# Patient Record
Sex: FEMALE | Race: WHITE | Employment: STUDENT | URBAN - METROPOLITAN AREA
[De-identification: names, ages, dates, MRNs, and addresses within clinical notes are randomized per-mention and may not be internally consistent; named-entity substitution may affect disease eponyms.]

---

## 2023-02-16 ENCOUNTER — HOSPITAL ENCOUNTER (EMERGENCY)
Age: 18
Discharge: HOME | End: 2023-02-16
Attending: EMERGENCY MEDICINE | Admitting: EMERGENCY MEDICINE

## 2023-02-16 ENCOUNTER — APPOINTMENT (OUTPATIENT)
Dept: GENERAL RADIOLOGY | Age: 18
End: 2023-02-16

## 2023-02-16 VITALS
RESPIRATION RATE: 18 BRPM | SYSTOLIC BLOOD PRESSURE: 95 MMHG | TEMPERATURE: 98.6 F | DIASTOLIC BLOOD PRESSURE: 61 MMHG | HEART RATE: 85 BPM

## 2023-02-16 DIAGNOSIS — M23.92 INTERNAL DERANGEMENT OF LEFT KNEE: Primary | ICD-10-CM

## 2023-02-16 PROCEDURE — 73564 X-RAY EXAM KNEE 4 OR MORE: CPT | Performed by: RADIOLOGY

## 2023-02-16 PROCEDURE — 99283 EMERGENCY DEPT VISIT LOW MDM: CPT

## 2023-02-16 RX ORDER — LAMOTRIGINE 200 MG/1
200 TABLET ORAL 2 TIMES DAILY
COMMUNITY

## 2023-02-16 RX ORDER — SERTRALINE HYDROCHLORIDE 25 MG/1
25 TABLET, FILM COATED ORAL DAILY
COMMUNITY

## 2023-02-16 NOTE — ED PROVIDER NOTES
CC/HPI: 23year-old female to the emergency department chief complaint of left knee injury. Patient states she was at a concert last night and she got bumped and she stepped awkwardly twisting her knee. Patient states she felt a sharp pain and it bothered her for the rest of the evening. Patient states she did not have any ice so could not ice it and overnight her knee became more swollen. Patient states she has a history of previous injury in which she believes she had a small tear in her meniscus. No paresthesias no other injury      VITALS/PMH/PSH: Reviewed per nurses notes    REVIEW OF SYSTEMS: As in chief complaint history of present illness, otherwise all other systems are reviewed and negative the total 10 systems reviewed    PHYSICAL EXAM:  GEN: Pt alert and oriented, no acute distress  HEENT:         Normocephalic/Atramatic        PERRL, EOMI  HEART: Reg S1/S2, without murmer, rub or gallop  LUNGS: Clear to auscultation bilaterally, respirations even and unlabored  MUSCULOSKELETAL/EXTREMITITES:  No other signs of trauma, cyanosis or edema. Examination of the left knee shows no redness warmth or signs of infection. Patient has what appears to be a moderate joint effusion. There is no swelling anterior to the patella to suggest bursitis. Patient with mild appearing tenderness along the medial aspect of the joint. Patient able to bend the knee to 90 degrees with discomfort. Negative drawer. No calf swelling or tenderness. Neurovascular intact distally. LYMPH: no peripheral lympadenopathy noted  SKIN:  Warm & dry, no rash  NEUROLOGIC:  Alert and oriented. Speech clear    Medical decision making/ED course;  X-rays were obtained which were interpreted by the radiologist as follows;    Narrative   EXAMINATION:   FOUR XRAY VIEWS OF THE LEFT KNEE       2/16/2023 10:11 am       COMPARISON:   None.        HISTORY:   ORDERING SYSTEM PROVIDED HISTORY: injury   TECHNOLOGIST PROVIDED HISTORY:   Reason for exam:->injury   Is the patient pregnant?->No   What reading provider will be dictating this exam?->CRC       FINDINGS:   Knee is normally aligned. No fracture or bone erosion identified. Small   suprapatellar effusion is noted. Joint spaces are preserved. Impression   Small suprapatellar effusion. No acute osseous abnormality. Patient was given a knee immobilizer and crutches and instructions on their use. Discussed the possibility of a ligamentous or tendinous injury and the need for follow-up with Ortho. Patient voiced understanding. Was offered stronger pain medication however refused. Final Clinical impression;  1) internal knee derangement left knee    Disposition/plan; patient discharged home in stable condition given discharge instructions on knee pain and injury. Patient referred to orthopedics. Over-the-counter ibuprofen as needed for discomfort. Frequent ice over the next 24 hours. Return for worsening or changes to symptoms.      Jc Ricketts DO  02/16/23 2270

## 2023-09-07 ENCOUNTER — HOSPITAL ENCOUNTER (OUTPATIENT)
Dept: GENERAL RADIOLOGY | Age: 18
Discharge: HOME | End: 2023-09-09
Payer: COMMERCIAL

## 2023-09-07 ENCOUNTER — OFFICE VISIT (OUTPATIENT)
Dept: FAMILY MEDICINE CLINIC | Age: 18
End: 2023-09-07
Payer: COMMERCIAL

## 2023-09-07 ENCOUNTER — HOSPITAL ENCOUNTER (OUTPATIENT)
Age: 18
Discharge: HOME | End: 2023-09-09
Payer: COMMERCIAL

## 2023-09-07 VITALS
SYSTOLIC BLOOD PRESSURE: 100 MMHG | HEIGHT: 66 IN | BODY MASS INDEX: 18.84 KG/M2 | DIASTOLIC BLOOD PRESSURE: 60 MMHG | HEART RATE: 79 BPM | TEMPERATURE: 97.6 F | OXYGEN SATURATION: 99 % | WEIGHT: 117.2 LBS

## 2023-09-07 DIAGNOSIS — R05.3 CHRONIC COUGH: Primary | ICD-10-CM

## 2023-09-07 DIAGNOSIS — R05.3 CHRONIC COUGH: ICD-10-CM

## 2023-09-07 PROCEDURE — 71046 X-RAY EXAM CHEST 2 VIEWS: CPT

## 2023-09-07 PROCEDURE — 99203 OFFICE O/P NEW LOW 30 MIN: CPT | Performed by: FAMILY MEDICINE

## 2023-09-07 RX ORDER — AZITHROMYCIN 250 MG/1
TABLET, FILM COATED ORAL
Qty: 6 TABLET | Refills: 0 | Status: SHIPPED | OUTPATIENT
Start: 2023-09-07

## 2023-09-07 RX ORDER — FLUTICASONE PROPIONATE 50 MCG
1 SPRAY, SUSPENSION (ML) NASAL DAILY
Qty: 1 EACH | Refills: 0 | Status: SHIPPED | OUTPATIENT
Start: 2023-09-07

## 2023-09-07 SDOH — ECONOMIC STABILITY: FOOD INSECURITY: WITHIN THE PAST 12 MONTHS, THE FOOD YOU BOUGHT JUST DIDN'T LAST AND YOU DIDN'T HAVE MONEY TO GET MORE.: NEVER TRUE

## 2023-09-07 SDOH — ECONOMIC STABILITY: INCOME INSECURITY: HOW HARD IS IT FOR YOU TO PAY FOR THE VERY BASICS LIKE FOOD, HOUSING, MEDICAL CARE, AND HEATING?: NOT HARD AT ALL

## 2023-09-07 SDOH — ECONOMIC STABILITY: HOUSING INSECURITY
IN THE LAST 12 MONTHS, WAS THERE A TIME WHEN YOU DID NOT HAVE A STEADY PLACE TO SLEEP OR SLEPT IN A SHELTER (INCLUDING NOW)?: NO

## 2023-09-07 SDOH — ECONOMIC STABILITY: FOOD INSECURITY: WITHIN THE PAST 12 MONTHS, YOU WORRIED THAT YOUR FOOD WOULD RUN OUT BEFORE YOU GOT MONEY TO BUY MORE.: NEVER TRUE

## 2023-09-07 ASSESSMENT — PATIENT HEALTH QUESTIONNAIRE - PHQ9
1. LITTLE INTEREST OR PLEASURE IN DOING THINGS: 0
SUM OF ALL RESPONSES TO PHQ QUESTIONS 1-9: 0
SUM OF ALL RESPONSES TO PHQ QUESTIONS 1-9: 0
4. FEELING TIRED OR HAVING LITTLE ENERGY: 0
3. TROUBLE FALLING OR STAYING ASLEEP: 0
9. THOUGHTS THAT YOU WOULD BE BETTER OFF DEAD, OR OF HURTING YOURSELF: 0
8. MOVING OR SPEAKING SO SLOWLY THAT OTHER PEOPLE COULD HAVE NOTICED. OR THE OPPOSITE, BEING SO FIGETY OR RESTLESS THAT YOU HAVE BEEN MOVING AROUND A LOT MORE THAN USUAL: 0
SUM OF ALL RESPONSES TO PHQ QUESTIONS 1-9: 0
2. FEELING DOWN, DEPRESSED OR HOPELESS: 0
7. TROUBLE CONCENTRATING ON THINGS, SUCH AS READING THE NEWSPAPER OR WATCHING TELEVISION: 0
6. FEELING BAD ABOUT YOURSELF - OR THAT YOU ARE A FAILURE OR HAVE LET YOURSELF OR YOUR FAMILY DOWN: 0
5. POOR APPETITE OR OVEREATING: 0
SUM OF ALL RESPONSES TO PHQ9 QUESTIONS 1 & 2: 0
SUM OF ALL RESPONSES TO PHQ QUESTIONS 1-9: 0
10. IF YOU CHECKED OFF ANY PROBLEMS, HOW DIFFICULT HAVE THESE PROBLEMS MADE IT FOR YOU TO DO YOUR WORK, TAKE CARE OF THINGS AT HOME, OR GET ALONG WITH OTHER PEOPLE: 0

## 2023-09-07 NOTE — PROGRESS NOTES
Subjective:      Patient ID: Hilary Sanches is a 21 y.o. female who presents today for:     Chief Complaint   Patient presents with    New Patient     Post covid cough onset february HPI  Hilary Sanches very pleasant 61-year-old female presents today to establish care. She states that she has had a cough since February after being diagnosed with COVID. She states her symptoms have improved however her cough did not completely resolve. She denies any productive quality to her cough and there are no alleviating or exacerbating factors. She denies any fever, or chills or shortness of breath. Symptoms are mildly worse when she lays down    Past Medical History:   Diagnosis Date    Depression      History reviewed. No pertinent surgical history. History reviewed. No pertinent family history. Social History     Socioeconomic History    Marital status: Single     Spouse name: Not on file    Number of children: Not on file    Years of education: Not on file    Highest education level: Not on file   Occupational History    Not on file   Tobacco Use    Smoking status: Never    Smokeless tobacco: Never   Substance and Sexual Activity    Alcohol use: Yes    Drug use: Yes     Types: Marijuana [place] [employer])    Sexual activity: Yes   Other Topics Concern    Not on file   Social History Narrative    Not on file     Social Determinants of Health     Financial Resource Strain: Low Risk  (9/7/2023)    Overall Financial Resource Strain (CARDIA)     Difficulty of Paying Living Expenses: Not hard at all   Food Insecurity: No Food Insecurity (9/7/2023)    Hunger Vital Sign     Worried About Running Out of Food in the Last Year: Never true     [address] in the Last Year: Never true   Transportation Needs: Unknown (9/7/2023)    PRAPARE - Transportation     Lack of Transportation (Medical): Not on file     Lack of Transportation (Non-Medical):  No   Physical Activity: Not on file   Stress: Not on file   Social

## 2023-09-10 ASSESSMENT — ENCOUNTER SYMPTOMS
SHORTNESS OF BREATH: 0
CHEST TIGHTNESS: 0
COUGH: 1
DIARRHEA: 0
BLOOD IN STOOL: 0
NAUSEA: 0
ABDOMINAL PAIN: 0
APNEA: 0
CONSTIPATION: 0
VOMITING: 0

## 2023-09-30 DIAGNOSIS — R05.3 CHRONIC COUGH: ICD-10-CM

## 2023-10-02 RX ORDER — FLUTICASONE PROPIONATE 50 MCG
SPRAY, SUSPENSION (ML) NASAL
Qty: 3 EACH | Refills: 1 | Status: SHIPPED | OUTPATIENT
Start: 2023-10-02

## 2023-10-02 NOTE — TELEPHONE ENCOUNTER
Comments:     Last Office Visit (last PCP visit):   9/7/2023    Next Visit Date:  No future appointments. **If hasn't been seen in over a year OR hasn't followed up according to last diabetes/ADHD visit, make appointment for patient before sending refill to provider.     Rx requested:  Requested Prescriptions     Pending Prescriptions Disp Refills    fluticasone (FLONASE) 50 MCG/ACT nasal spray [Pharmacy Med Name: FLUTICASONE PROP 50 MCG SPRAY]       Sig: SPRAY 1 SPRAY BY NASAL ROUTE EVERY DAY

## 2024-01-31 ENCOUNTER — CLINICAL SUPPORT (OUTPATIENT)
Dept: ORTHOPEDIC SURGERY | Facility: CLINIC | Age: 19
End: 2024-01-31
Payer: COMMERCIAL

## 2024-01-31 ENCOUNTER — OFFICE VISIT (OUTPATIENT)
Dept: ORTHOPEDIC SURGERY | Facility: CLINIC | Age: 19
End: 2024-01-31
Payer: COMMERCIAL

## 2024-01-31 DIAGNOSIS — M25.562 ACUTE PAIN OF BOTH KNEES: ICD-10-CM

## 2024-01-31 DIAGNOSIS — M25.561 ACUTE PAIN OF BOTH KNEES: ICD-10-CM

## 2024-01-31 DIAGNOSIS — M25.361 PATELLAR INSTABILITY OF BOTH KNEES: Primary | ICD-10-CM

## 2024-01-31 DIAGNOSIS — M25.362 PATELLAR INSTABILITY OF BOTH KNEES: Primary | ICD-10-CM

## 2024-01-31 PROCEDURE — 73560 X-RAY EXAM OF KNEE 1 OR 2: CPT | Mod: BILATERAL PROCEDURE | Performed by: ORTHOPAEDIC SURGERY

## 2024-01-31 PROCEDURE — 99214 OFFICE O/P EST MOD 30 MIN: CPT | Performed by: ORTHOPAEDIC SURGERY

## 2024-01-31 NOTE — PROGRESS NOTES
History of present illness: Bilateral patella instability    She has high riding patella left more symptomatic than right no real history of trauma no obvious complete dislocation    She saw my partner a year ago with a knee injury at a concert was more of an MCL sprain strain she never fully dislocated she never followed up with MRI it got better for a while but now both knees give her a sense and feel of instability    Physical exam:    General: No acute distress or breathing difficulty or discomfort, pleasant and cooperative with the examination.    Extremities: Both knees examined and inspected    She can straight leg raise flex and extend both knees have a negative Lachman's pivot shift and Zahira exam both knees are stable to collateral stress testing    Both knees can plantarflex Rozek toes foot and ankle extensor maxim is intact there is no obvious J sign    Now the left knee seems to have about a 3 out of 4 quadrant glide laterally 2 out of 4 quadrant medially    The right knee is more symmetric with a 2 out of 4 quadrant glide both medial and laterally    At this time she has some mild patellofemoral crepitus more on the left than right that causes irritability    Both knees show no obvious gross swelling no large effusion.    We did inspect the feet on both sides they are relatively well-preserved with arches and she is not significantly flat although she is wearing shoes with no arch support    Diagnostic studies: X-rays show surprisingly moderate joint space narrowing medially but no fracture dislocation    Impression: Bilateral patella instability left greater than right    Plan: PT therapy rehab program    High arch support shoes    Anti-inflammatories can be beneficial    We can talk about some patellar bracing if she does not improve    I will see her back in 3 to 4 weeks if not improved will get bilateral sunrise views and talk about some patella stabilization bracing system

## 2024-02-15 ENCOUNTER — EVALUATION (OUTPATIENT)
Dept: PHYSICAL THERAPY | Facility: CLINIC | Age: 19
End: 2024-02-15
Payer: COMMERCIAL

## 2024-02-15 DIAGNOSIS — M62.81 QUADRICEPS WEAKNESS: ICD-10-CM

## 2024-02-15 DIAGNOSIS — M25.361 PATELLAR INSTABILITY OF BOTH KNEES: ICD-10-CM

## 2024-02-15 DIAGNOSIS — M25.562 CHRONIC PAIN OF BOTH KNEES: Primary | ICD-10-CM

## 2024-02-15 DIAGNOSIS — G89.29 CHRONIC PAIN OF BOTH KNEES: Primary | ICD-10-CM

## 2024-02-15 DIAGNOSIS — M25.362 PATELLAR INSTABILITY OF BOTH KNEES: ICD-10-CM

## 2024-02-15 DIAGNOSIS — M25.561 CHRONIC PAIN OF BOTH KNEES: Primary | ICD-10-CM

## 2024-02-15 PROCEDURE — 97110 THERAPEUTIC EXERCISES: CPT | Performed by: PHYSICAL THERAPIST

## 2024-02-15 PROCEDURE — 97161 PT EVAL LOW COMPLEX 20 MIN: CPT | Performed by: PHYSICAL THERAPIST

## 2024-02-15 ASSESSMENT — ENCOUNTER SYMPTOMS
OCCASIONAL FEELINGS OF UNSTEADINESS: 0
LOSS OF SENSATION IN FEET: 0
DEPRESSION: 0

## 2024-02-15 NOTE — PROGRESS NOTES
Physical Therapy    Physical Therapy Evaluation and Treatment      Patient Name: Toya Keane  MRN: [id number]  Today's Date: 2/15/2024  Time Calculation  Start Time: 0315  Stop Time: 0400  Time Calculation (min): 45 min    Assessment:  PT Assessment  PT Assessment Results: Decreased strength, Decreased range of motion, Decreased endurance, Impaired balance, Decreased coordination, Pain  Rehab Prognosis: Good   Patient is a 18 year old female that presents to physical therapy evaluation today due to complaints of bilateral knee pain, L worse than R.  Patient demonstrates a decline in their functional mobility secondary to pain, hyper mobility of the patellofemoral joint, strength deficits in the quadriceps and pelvic musculature, and motor control deficits including SL stance and gait. Due to these impairments, the patients has the following functional limitations: pain with standing for long periods of time,  difficulty walking all distances, squatting, ascending/descending stairs, performing all household chores and participating in all leisure activities.  Pt will benefit from continued skilled therapy to address their impairments and progress towards the associated functional goals.      Plan:  OP PT Plan  Treatment/Interventions: Aquatic therapy, Biofeedback, Cryotherapy, Dry needling, Education/ Instruction, Gait training, Hot pack, Manual therapy, Neuromuscular re-education, Taping techniques, Therapeutic exercises, Ultrasound, Vasopneumatic device  PT Plan: Skilled PT  PT Frequency: Other (Comment) (1x a week every 2 weeks for a total of 6 visits)  Rehab Potential: Good  Plan of Care Agreement: Patient  Plan to continue to work on progressing towards established rehab goals as per patient tolerance.       Current Problem:   1. Chronic pain of both knees  Follow Up In Physical Therapy      2. Quadriceps weakness  Follow Up In Physical Therapy      3. Patellar instability of both knees  Follow Up In  "Physical Therapy          Subjective    Pt is a 18 year old [employer] student from [place].      Pt reports she thinks she tore the MCL about a year ago and did not do a ton of rehab for it post. She states the L knee is worse compared to her R.     Pt states wearing the braces she has sometimes makes the pain worse.     Pt states her L knee is doing well today. No pain to start, but last week it was so painful she felt she couldn't get to class. Pt rates pain that day around 6/10. Pt states she has dealt with a lot of instability in her hips too. Has been told she is a little more ligamentously lax.       General:  General  Reason for Referral: RONALD KNEES  Referred By: Dr. Derik Powers  Precautions:  N/a            Objective   Knee Musculoskeletal Exam  Gait    Gait is normal.    Inspection    Right      Edema: none        Deformity: none      Left      Edema: none        Deformity: none      Palpation    Right      Tenderness: present          Patella: mild          Patellar tendon: mild      Left      Tenderness: present          Medial joint line: mild          Patella: moderate          Patellar tendon: moderate          Pes anserinus: mild      Range of Motion    Right      Right knee range of motion is normal and full.      Left      Left knee range of motion is normal and full.      Strength    Right      Extension: 4+/5.       Flexion: 4+/5.     Left      Extension: 4/5. Extension is affected by pain.       Flexion: 4/5.     Special Signs    Left      Patellar compression: mild        Patellar apprehension: mild      Hip Musculoskeletal Exam  Gait    Gait is normal.    Range of Motion    Left      Left hip range of motion is within functional limits.       Active ROM: pain.  Active ROM comment: hip ROM WFL but pain with end range flexion and patient felt as hip he hip could \"pop\" out.     Strength    Left      Extension: 4-/5.       Flexion: 4/5. Flexion is affected by pain.       Adduction: " 4/5.       Abduction: 4-/5.        Outcome Measures:  Other Measures  Lower Extremity Funtional Score (LEFS): 58/80     Treatments:  Therapeutic Exercise  Therapeutic Exercise Performed: Yes  Therapeutic Exercise Activity 1: 4 way SLR x10    EDUCATION:  Outpatient Education  Individual(s) Educated: Patient  Education Provided: Anatomy, Body Mechanics, Home Exercise Program, POC  Risk and Benefits Discussed with Patient/Caregiver/Other: yes  Patient/Caregiver Demonstrated Understanding: yes  Plan of Care Discussed and Agreed Upon: yes  Patient Response to Education: Patient/Caregiver Verbalized Understanding of Information    The patient was educated on: the importance of positioning, proper posture, and body mechanics, joint mechanics and pathology, general tissue healing time, the appropriate use of heat and cold to control pain and inflammation, the importance of general therapeutic exercise, especially to stay within pain-free ROM, specific anatomy, function, & regional interdependence of involved areas, & likely cause of impairments & POC. Pt's questions were answered to their satisfaction, & pt. verbalized understanding & agreement with POC    Access Code: [email or web address]  URL: [email or web address]  Date: 02/15/2024  Prepared by: Twila Barajas    Goals:    Balance: Pt able to single leg stand for 5-10 seconds  Gait/Locomotion: Pt ambulate with least restrictive device with normal gait pattern. Pt ambulate up and down a flight of stairs reciprocally with 1 rail.  Pain: None to minimal david knee pain   Strength: knee/LE 4+-5/5 gross strength   No difficulty with basic and instrumental ADL's.   Independent with HEP.

## 2024-02-15 NOTE — PATIENT INSTRUCTIONS
Access Code: [email or web address]  URL: [email or web address]  Date: 02/15/2024  Prepared by: Twila Barajas    Exercises  - Supine Active Straight Leg Raise  - 4 x weekly - 3 sets - 10 reps - 1 hold  - Sidelying Hip Abduction  - 4 x weekly - 3 sets - 10 reps - 1 hold  - Sidelying Hip Adduction  - 4 x weekly - 3 sets - 10 reps - 1 hold  - Prone Hip Extension  - 4 x weekly - 3 sets - 10 reps - 1 hold

## 2024-03-06 ENCOUNTER — APPOINTMENT (OUTPATIENT)
Dept: ORTHOPEDIC SURGERY | Facility: CLINIC | Age: 19
End: 2024-03-06
Payer: COMMERCIAL

## 2024-03-07 ENCOUNTER — TREATMENT (OUTPATIENT)
Dept: PHYSICAL THERAPY | Facility: CLINIC | Age: 19
End: 2024-03-07
Payer: COMMERCIAL

## 2024-03-07 DIAGNOSIS — M25.361 PATELLAR INSTABILITY OF BOTH KNEES: ICD-10-CM

## 2024-03-07 DIAGNOSIS — M25.561 CHRONIC PAIN OF BOTH KNEES: ICD-10-CM

## 2024-03-07 DIAGNOSIS — M25.562 CHRONIC PAIN OF BOTH KNEES: ICD-10-CM

## 2024-03-07 DIAGNOSIS — M62.81 QUADRICEPS WEAKNESS: ICD-10-CM

## 2024-03-07 DIAGNOSIS — M25.362 PATELLAR INSTABILITY OF BOTH KNEES: ICD-10-CM

## 2024-03-07 DIAGNOSIS — G89.29 CHRONIC PAIN OF BOTH KNEES: ICD-10-CM

## 2024-03-07 PROCEDURE — 97110 THERAPEUTIC EXERCISES: CPT | Performed by: PHYSICAL THERAPIST

## 2024-03-07 ASSESSMENT — PAIN SCALES - GENERAL: PAINLEVEL_OUTOF10: 2

## 2024-03-07 ASSESSMENT — PAIN - FUNCTIONAL ASSESSMENT: PAIN_FUNCTIONAL_ASSESSMENT: 0-10

## 2024-03-07 NOTE — PATIENT INSTRUCTIONS
Access Code: [email or web address]  URL: [email or web address]  Date: 03/07/2024  Prepared by: Twila Barajas    Exercises  - Supine Active Straight Leg Raise  - 4 x weekly - 3 sets - 10 reps - 1 hold  - Sidelying Hip Abduction  - 4 x weekly - 3 sets - 10 reps - 1 hold  - Sidelying Hip Adduction  - 4 x weekly - 3 sets - 10 reps - 1 hold  - Prone Hip Extension  - 4 x weekly - 3 sets - 10 reps - 1 hold  - Hip Abduction with Resistance Loop  - 4 x weekly - 3 sets - 10 reps - 1 hold  - Hip Extension with Resistance Loop  - 4 x weekly - 3 sets - 10 reps - 1 hold  - Standing Hip Flexion with Resistance Loop  - 4 x weekly - 3 sets - 10 reps - 1 hold  - Heel Raises with Counter Support  - 4 x weekly - 3 sets - 10 reps - 1 hold  - Toe Raise With Back Against Wall  - 4 x weekly - 3 sets - 10 reps - 1 hold  - Supine Bridge with Mini Swiss Ball Between Knees  - 4 x weekly - 3 sets - 10 reps - 1 hold

## 2024-03-07 NOTE — PROGRESS NOTES
Physical Therapy    Physical Therapy Treatment    Patient Name: Toya Keane  MRN: [id number]  Today's Date: 3/7/2024  Time Calculation  Start Time: 0230  Stop Time: 0308  Time Calculation (min): 38 min      Assessment:   Pt reporting non-compliance with her home program on evaluation secondary to some mental health issues but reports she hope to do better from here on out. Discussed biopsychosocial factors and how they effect pain. Added in some addition hip and knee strengthening exercises this date and patient tolerated well. Provided her an updated handout and discussed importance of compliance in order to receive best results from formal PT.        Plan:   Plan to continue to work on progressing towards established rehab goals as per patient tolerance.       Current Problem  1. Chronic pain of both knees  Follow Up In Physical Therapy      2. Quadriceps weakness  Follow Up In Physical Therapy      3. Patellar instability of both knees  Follow Up In Physical Therapy          General  PT  Visit  PT Received On: 03/07/24  Response to Previous Treatment: Not compliant with home exercise program  General  Reason for Referral: RONALD KNEES  Referred By: Dr. Derik Powers    Subjective      Pt reports she has not been very compliant with her exercises since the evaluation secondary to some mental health issues. She was out of her ADHD medication which she thinks played a role. She wants to try harder to work on her exercises but her pain in the knees has been a little bit better overall. No pain in the R knee coming in L knee 2/10.     Precautions  N/a     Pain  Pain Assessment  Pain Assessment: 0-10  Pain Score: 2  Pain Type: Chronic pain  Pain Location: Knee  Pain Orientation: Left    Objective     Treatments:  Therapeutic Exercise  Therapeutic Exercise Performed: Yes  Therapeutic Exercise Activity 1: recumbent bike 8 min level 3  Therapeutic Exercise Activity 2: standing hip PRE's red 3x10 all  Therapeutic  Exercise Activity 3: DL heel raise/toe raise 1/2 foam 3x10  Therapeutic Exercise Activity 4: LAQ 3x10 4#  Therapeutic Exercise Activity 5: DL bridge with adduction 3x10    OP EDUCATION:  The patient was educated on: the importance of positioning, proper posture, and body mechanics, joint mechanics and pathology, general tissue healing time, the appropriate use of heat and cold to control pain and inflammation, the importance of general therapeutic exercise, especially to stay within pain-free ROM, specific anatomy, function, & regional interdependence of involved areas, & likely cause of impairments & POC. Pt's questions were answered to their satisfaction, & pt. verbalized understanding & agreement with POC    Access Code: [email or web address]  URL: [email or web address]  Date: 02/15/2024  Prepared by: Twila Barajas    Goals:    Balance: Pt able to single leg stand for 5-10 seconds  Gait/Locomotion: Pt ambulate with least restrictive device with normal gait pattern. Pt ambulate up and down a flight of stairs reciprocally with 1 rail.  Pain: None to minimal david knee pain   Strength: knee/LE 4+-5/5 gross strength   No difficulty with basic and instrumental ADL's.   Independent with HEP.

## 2024-04-02 ENCOUNTER — TREATMENT (OUTPATIENT)
Dept: PHYSICAL THERAPY | Facility: CLINIC | Age: 19
End: 2024-04-02
Payer: COMMERCIAL

## 2024-04-02 DIAGNOSIS — M25.361 PATELLAR INSTABILITY OF BOTH KNEES: ICD-10-CM

## 2024-04-02 DIAGNOSIS — G89.29 CHRONIC PAIN OF BOTH KNEES: ICD-10-CM

## 2024-04-02 DIAGNOSIS — M25.561 CHRONIC PAIN OF BOTH KNEES: ICD-10-CM

## 2024-04-02 DIAGNOSIS — M25.362 PATELLAR INSTABILITY OF BOTH KNEES: ICD-10-CM

## 2024-04-02 DIAGNOSIS — M62.81 QUADRICEPS WEAKNESS: ICD-10-CM

## 2024-04-02 DIAGNOSIS — M25.562 CHRONIC PAIN OF BOTH KNEES: ICD-10-CM

## 2024-04-02 PROCEDURE — 97110 THERAPEUTIC EXERCISES: CPT | Performed by: PHYSICAL THERAPIST

## 2024-04-02 ASSESSMENT — PAIN - FUNCTIONAL ASSESSMENT: PAIN_FUNCTIONAL_ASSESSMENT: 0-10

## 2024-04-02 ASSESSMENT — PAIN SCALES - GENERAL: PAINLEVEL_OUTOF10: 4

## 2024-04-02 NOTE — PATIENT INSTRUCTIONS
Access Code: [email or web address]  URL: [email or web address]  Date: 04/02/2024  Prepared by: Twila Barajas    Exercises  - Supine Active Straight Leg Raise  - 4 x weekly - 3 sets - 10 reps - 1 hold  - Sidelying Hip Abduction  - 4 x weekly - 3 sets - 10 reps - 1 hold  - Sidelying Hip Adduction  - 4 x weekly - 3 sets - 10 reps - 1 hold  - Prone Hip Extension  - 4 x weekly - 3 sets - 10 reps - 1 hold  - Hip Abduction with Resistance Loop  - 4 x weekly - 3 sets - 10 reps - 1 hold  - Hip Extension with Resistance Loop  - 4 x weekly - 3 sets - 10 reps - 1 hold  - Standing Hip Flexion with Resistance Loop  - 4 x weekly - 3 sets - 10 reps - 1 hold  - Heel Raises with Counter Support  - 4 x weekly - 3 sets - 10 reps - 1 hold  - Toe Raise With Back Against Wall  - 4 x weekly - 3 sets - 10 reps - 1 hold  - Supine Bridge with Mini Swiss Ball Between Knees  - 4 x weekly - 3 sets - 10 reps - 1 hold  - Bridge with Hip Abduction and Resistance  - 4 x weekly - 3 sets - 10 reps - 1 hold  - Clamshell with Resistance  - 4 x weekly - 3 sets - 10 reps - 1 hold

## 2024-04-02 NOTE — PROGRESS NOTES
Physical Therapy    Physical Therapy Treatment    Patient Name: Toya Keane  MRN: [id number]  Today's Date: 4/2/2024  Time Calculation  Start Time: 0140  Stop Time: 0218  Time Calculation (min): 38 min      Assessment:   Pt reporting overall improvement in her knees since last session and reports improved compliance with her exercises. States she has recently noticed some shoulder pain as well. In regards to her shoulder I feel she is also dealing with some hypermobility/instability. Demonstrated some light strengthening exercises she can work on at home. She did well with addition of new glute strengthening exercises this date. Some cues to engage band with resisted side steps. Provided patient updated handout and reviewed in depth.     Plan:   Plan to continue to work on progressing towards established rehab goals as per patient tolerance.       Current Problem  1. Chronic pain of both knees  Follow Up In Physical Therapy      2. Quadriceps weakness  Follow Up In Physical Therapy      3. Patellar instability of both knees  Follow Up In Physical Therapy          General  PT  Visit  PT Received On: 04/02/24  Response to Previous Treatment: Patient with no complaints from previous session., Partial compliance with home exercise program  General  Reason for Referral: RONALD KNEES  Referred By: Dr. Derik Powers    Subjective      Pt reports she has been doing well overall. She feels some improvement in the knees. She did a lot of walking over her break with inclines and felt good. Pt rates pain 4/10 to begin today.   Pt reports she has also been struggling with some shoulder pain recently.     Precautions  N/a     Pain  Pain Assessment  Pain Assessment: 0-10  Pain Score: 4  Pain Type: Chronic pain  Pain Location: Knee  Pain Orientation:  (ronald knees/thigh)    Objective     Cues to push knees out into band with resisted side steps       Treatments:  Therapeutic Exercise  Therapeutic Exercise Performed:  "Yes  Therapeutic Exercise Activity 1: recumbent bike 6 min level 3  Therapeutic Exercise Activity 2: DL bridge with abduction 3x10 green  Therapeutic Exercise Activity 3: s/l clamshells green 3x10  Therapeutic Exercise Activity 4: resisted side steps green band at knees 3 laps  Therapeutic Exercise Activity 5: resisted row x10 green  Therapeutic Exercise Activity 6: resisted shoulder extension green x10  Therapeutic Exercise Activity 7: resisted shoulder abd red x10  Therapeutic Exercise Activity 8: resisted david shoulder ER red x10  Therapeutic Exercise Activity 9: quadriceps stretch with strap 3x20\" each side    OP EDUCATION:  The patient was educated on: the importance of positioning, proper posture, and body mechanics, joint mechanics and pathology, general tissue healing time, the appropriate use of heat and cold to control pain and inflammation, the importance of general therapeutic exercise, especially to stay within pain-free ROM, specific anatomy, function, & regional interdependence of involved areas, & likely cause of impairments & POC. Pt's questions were answered to their satisfaction, & pt. verbalized understanding & agreement with POC    Access Code: [email or web address]  URL: [email or web address]  Date: 02/15/2024  Prepared by: Twila Barajas    Goals:    Balance: Pt able to single leg stand for 5-10 seconds  Gait/Locomotion: Pt ambulate with least restrictive device with normal gait pattern. Pt ambulate up and down a flight of stairs reciprocally with 1 rail.  Pain: None to minimal david knee pain   Strength: knee/LE 4+-5/5 gross strength   No difficulty with basic and instrumental ADL's.   Independent with HEP.  "

## 2024-04-04 ENCOUNTER — HOSPITAL ENCOUNTER (OUTPATIENT)
Dept: RADIOLOGY | Facility: CLINIC | Age: 19
Discharge: HOME | End: 2024-04-04
Payer: COMMERCIAL

## 2024-04-04 DIAGNOSIS — S69.91XA UNSPECIFIED INJURY OF RIGHT WRIST, HAND AND FINGER(S), INITIAL ENCOUNTER: ICD-10-CM

## 2024-04-04 PROCEDURE — 73110 X-RAY EXAM OF WRIST: CPT | Mod: RIGHT SIDE | Performed by: RADIOLOGY

## 2024-04-04 PROCEDURE — 73110 X-RAY EXAM OF WRIST: CPT | Mod: RT,LIO,ASTAT

## 2024-04-04 PROCEDURE — 73130 X-RAY EXAM OF HAND: CPT | Mod: RIGHT SIDE | Performed by: RADIOLOGY

## 2024-04-04 PROCEDURE — 73130 X-RAY EXAM OF HAND: CPT | Mod: RT,LIO,ASTAT

## 2024-04-16 ENCOUNTER — TREATMENT (OUTPATIENT)
Dept: PHYSICAL THERAPY | Facility: CLINIC | Age: 19
End: 2024-04-16
Payer: COMMERCIAL

## 2024-04-16 DIAGNOSIS — M25.562 CHRONIC PAIN OF BOTH KNEES: ICD-10-CM

## 2024-04-16 DIAGNOSIS — M25.362 PATELLAR INSTABILITY OF BOTH KNEES: ICD-10-CM

## 2024-04-16 DIAGNOSIS — G89.29 CHRONIC PAIN OF BOTH KNEES: ICD-10-CM

## 2024-04-16 DIAGNOSIS — M25.561 CHRONIC PAIN OF BOTH KNEES: ICD-10-CM

## 2024-04-16 DIAGNOSIS — M25.361 PATELLAR INSTABILITY OF BOTH KNEES: ICD-10-CM

## 2024-04-16 DIAGNOSIS — M62.81 QUADRICEPS WEAKNESS: ICD-10-CM

## 2024-04-16 PROCEDURE — 97110 THERAPEUTIC EXERCISES: CPT | Performed by: PHYSICAL THERAPIST

## 2024-04-16 ASSESSMENT — PAIN SCALES - GENERAL: PAINLEVEL_OUTOF10: 2

## 2024-04-16 ASSESSMENT — PAIN - FUNCTIONAL ASSESSMENT: PAIN_FUNCTIONAL_ASSESSMENT: 0-10

## 2024-04-16 NOTE — PATIENT INSTRUCTIONS
Access Code: [email or web address]  URL: [email or web address]  Date: 04/16/2024  Prepared by: Twila Barajas    Exercises  - Supine Active Straight Leg Raise  - 4 x weekly - 3 sets - 10 reps - 1 hold  - Sidelying Hip Abduction  - 4 x weekly - 3 sets - 10 reps - 1 hold  - Sidelying Hip Adduction  - 4 x weekly - 3 sets - 10 reps - 1 hold  - Prone Hip Extension  - 4 x weekly - 3 sets - 10 reps - 1 hold  - Hip Abduction with Resistance Loop  - 4 x weekly - 3 sets - 10 reps - 1 hold  - Hip Extension with Resistance Loop  - 4 x weekly - 3 sets - 10 reps - 1 hold  - Standing Hip Flexion with Resistance Loop  - 4 x weekly - 3 sets - 10 reps - 1 hold  - Heel Raises with Counter Support  - 4 x weekly - 3 sets - 10 reps - 1 hold  - Toe Raise With Back Against Wall  - 4 x weekly - 3 sets - 10 reps - 1 hold  - Supine Bridge with Mini Swiss Ball Between Knees  - 4 x weekly - 3 sets - 10 reps - 1 hold  - Bridge with Hip Abduction and Resistance  - 4 x weekly - 3 sets - 10 reps - 1 hold  - Clamshell with Resistance  - 4 x weekly - 3 sets - 10 reps - 1 hold  - Side Stepping with Resistance at Thighs  - 4 x weekly - 3 sets - 10 reps  - Standing Shoulder Row with Anchored Resistance  - 4 x weekly - 3 sets - 10 reps - 1 hold  - Shoulder extension with resistance - Neutral  - 4 x weekly - 3 sets - 10 reps - 1 hold  - Standing Shoulder Horizontal Abduction with Resistance  - 4 x weekly - 3 sets - 10 reps - 1 hold  - Shoulder External Rotation and Scapular Retraction with Resistance  - 4 x weekly - 3 sets - 10 reps - 1 hold  - Sit to Stand with Arms Crossed  - 4 x weekly - 3 sets - 10 reps  - Standing Shoulder Flexion to 90 Degrees with Dumbbells  - 4 x weekly - 2 sets - 10 reps  - Scaption with Dumbbells  - 4 x weekly - 2 sets - 10 reps  - Shoulder Abduction with Dumbbells - Palms Down  - 4 x weekly - 2 sets - 10 reps

## 2024-04-16 NOTE — PROGRESS NOTES
Physical Therapy    Physical Therapy Treatment    Patient Name: Toya Keane  MRN: [id number]  Today's Date: 4/16/2024  Time Calculation  Start Time: 0143  Stop Time: 0223  Time Calculation (min): 40 min      Assessment:   Pt reporting improvement overall in her knee pain and shoulder pain. She reports she has been more compliant with her home program as well. She does still get some soreness in the knee but it is improving. Continued to focus on strength and stability today. Pt did well with balance ball tosses and step ups with knee drive. Slightly challenged with squats to table. Provided her a new handout and reviewed in depth.     Plan:   Plan to continue to work on progressing towards established rehab goals as per patient tolerance.       Current Problem  1. Chronic pain of both knees  Follow Up In Physical Therapy      2. Quadriceps weakness  Follow Up In Physical Therapy      3. Patellar instability of both knees  Follow Up In Physical Therapy          General  PT  Visit  PT Received On: 04/16/24  Response to Previous Treatment: Patient with no complaints from previous session., Compliant with home exercise program  General  Reason for Referral: RONALD KNEES  Referred By: Dr. Derik Powers    Subjective      Pt reports she has been doing well overall. She reports her knees have not been bothering her as much and she tolerated the shoulder exercises from last session well.     Precautions  N/a     Pain  Pain Assessment  Pain Assessment: 0-10  Pain Score: 2  Pain Type: Chronic pain  Pain Location: Knee  Pain Orientation: Left    Objective     Good knee tracking with lateral tap downs    Treatments:  Therapeutic Exercise  Therapeutic Exercise Performed: Yes  Therapeutic Exercise Activity 1: recumbent bike 6 min level 3  Therapeutic Exercise Activity 2: standing hip PRE's red 3x10  Therapeutic Exercise Activity 3: DL heel raise/toe raise 1/2 foam 3x10  Therapeutic Exercise Activity 4: SL balance blue foam  "3x10 green front  Therapeutic Exercise Activity 5: step up front 8\" balance hold at top 3x10 each  Therapeutic Exercise Activity 6: lateral tap downs 6\" 2x10  Therapeutic Exercise Activity 7: sit to stands from table arms crossed 3x10  Therapeutic Exercise Activity 8: LAQ 5# 3x10  Therapeutic Exercise Activity 9: shoulder I/Y/T raises 2x10 2#    OP EDUCATION:  The patient was educated on: the importance of positioning, proper posture, and body mechanics, joint mechanics and pathology, general tissue healing time, the appropriate use of heat and cold to control pain and inflammation, the importance of general therapeutic exercise, especially to stay within pain-free ROM, specific anatomy, function, & regional interdependence of involved areas, & likely cause of impairments & POC. Pt's questions were answered to their satisfaction, & pt. verbalized understanding & agreement with POC    Access Code: [email or web address]  URL: [email or web address]  Date: 02/15/2024  Prepared by: Twila Barajas    Goals:    Balance: Pt able to single leg stand for 5-10 seconds  Gait/Locomotion: Pt ambulate with least restrictive device with normal gait pattern. Pt ambulate up and down a flight of stairs reciprocally with 1 rail.  Pain: None to minimal david knee pain   Strength: knee/LE 4+-5/5 gross strength   No difficulty with basic and instrumental ADL's.   Independent with HEP.  "

## 2024-04-30 ENCOUNTER — APPOINTMENT (OUTPATIENT)
Dept: PHYSICAL THERAPY | Facility: CLINIC | Age: 19
End: 2024-04-30
Payer: COMMERCIAL

## 2024-05-07 ENCOUNTER — TREATMENT (OUTPATIENT)
Dept: PHYSICAL THERAPY | Facility: CLINIC | Age: 19
End: 2024-05-07
Payer: COMMERCIAL

## 2024-05-07 DIAGNOSIS — G89.29 CHRONIC PAIN OF BOTH KNEES: ICD-10-CM

## 2024-05-07 DIAGNOSIS — M62.81 QUADRICEPS WEAKNESS: ICD-10-CM

## 2024-05-07 DIAGNOSIS — M25.361 PATELLAR INSTABILITY OF BOTH KNEES: ICD-10-CM

## 2024-05-07 DIAGNOSIS — M25.562 CHRONIC PAIN OF BOTH KNEES: ICD-10-CM

## 2024-05-07 DIAGNOSIS — M25.362 PATELLAR INSTABILITY OF BOTH KNEES: ICD-10-CM

## 2024-05-07 DIAGNOSIS — M25.561 CHRONIC PAIN OF BOTH KNEES: ICD-10-CM

## 2024-05-07 PROCEDURE — 97110 THERAPEUTIC EXERCISES: CPT | Performed by: PHYSICAL THERAPIST

## 2024-05-07 ASSESSMENT — PAIN SCALES - GENERAL: PAINLEVEL_OUTOF10: 2

## 2024-05-07 ASSESSMENT — PAIN - FUNCTIONAL ASSESSMENT: PAIN_FUNCTIONAL_ASSESSMENT: 0-10

## 2024-05-07 NOTE — PROGRESS NOTES
Physical Therapy    Physical Therapy Treatment    Patient Name: Toya Keane  MRN: [id number]  Today's Date: 5/7/2024  Time Calculation  Start Time: 0150  Stop Time: 0228  Time Calculation (min): 38 min      Assessment:    Pt has completed 5 formal physical therapy visits thus far and has progressed accordingly. Today's session focused on reviewing essential home exercises to continue with independently upon discharge today. Provided patient updated handout and reviewed in depth. Answered all questions and instructed patient to reach out should any arise.    Plan:   Plan to discharge patient to home program as she is leaving college for the summer.       Current Problem  1. Chronic pain of both knees  Follow Up In Physical Therapy      2. Quadriceps weakness  Follow Up In Physical Therapy      3. Patellar instability of both knees  Follow Up In Physical Therapy          General  PT  Visit  PT Received On: 05/07/24  Response to Previous Treatment: Patient with no complaints from previous session., Compliant with home exercise program  General  Reason for Referral: RONALD KNEES  Referred By: Dr. Derik Powers '    Subjective      Pt reports she has been doing okay overall. She reports she has not been as compliant with her home exercises secondary to being so busy with school/finals. She will be leaving sometime next week. Pt reports she feels about 80% better since starting formal PT.     Precautions  N/a     Pain  Pain Assessment  Pain Assessment: 0-10  Pain Score: 2  Pain Type: Chronic pain  Pain Location: Knee  Pain Orientation: Left    Objective   Knee Musculoskeletal Exam  Gait    Gait is normal.    Range of Motion    Right      Right knee range of motion is normal and full.      Left      Left knee range of motion is normal and full.      Strength    Right      Extension: 5/5.       Flexion: 5/5.     Left      Extension: 5/5.       Flexion: 5/5.     Hip Musculoskeletal Exam  Gait    Gait is  normal.    Strength    Right      Extension: 4+/5.       Internal rotation: 4+/5.       Abduction: 4+/5.     Left      Extension: 4+/5.       Flexion: 4+/5.       Abduction: 4+/5.            Outcome Measures:  Other Measures  Lower Extremity Funtional Score (LEFS): 70/80  Treatments:  Therapeutic Exercise  Therapeutic Exercise Performed: Yes  Therapeutic Exercise Activity 1: recumbent bike level 3 6 min  Therapeutic Exercise Activity 2: DL bridge blue 3x10  Therapeutic Exercise Activity 3: s/l clamshells blue 3x10  Therapeutic Exercise Activity 4: standing hip PRE's green 3x10  Therapeutic Exercise Activity 5: HEP review    OP EDUCATION:  The patient was educated on: the importance of positioning, proper posture, and body mechanics, joint mechanics and pathology, general tissue healing time, the appropriate use of heat and cold to control pain and inflammation, the importance of general therapeutic exercise, especially to stay within pain-free ROM, specific anatomy, function, & regional interdependence of involved areas, & likely cause of impairments & POC. Pt's questions were answered to their satisfaction, & pt. verbalized understanding & agreement with POC    Access Code: [email or web address]  URL: [email or web address]  Date: 02/15/2024  Prepared by: Twila Barajas    Goals:    Balance: Pt able to single leg stand for 5-10 seconds- met  Gait/Locomotion: Pt ambulate with least restrictive device with normal gait pattern. Pt ambulate up and down a flight of stairs reciprocally with 1 rail.- met  Pain: None to minimal david knee pain - goal mostly met  Strength: knee/LE 4+-5/5 gross strength - goal met  No difficulty with basic and instrumental ADL's. - goal met  Independent with HEP.- goal met

## 2024-09-13 ENCOUNTER — APPOINTMENT (OUTPATIENT)
Dept: GENERAL RADIOLOGY | Age: 19
End: 2024-09-13
Payer: COMMERCIAL

## 2024-09-13 ENCOUNTER — HOSPITAL ENCOUNTER (OUTPATIENT)
Dept: LAB | Age: 19
Discharge: HOME | End: 2024-09-13
Payer: COMMERCIAL

## 2024-09-13 ENCOUNTER — HOSPITAL ENCOUNTER (EMERGENCY)
Age: 19
Discharge: HOME | End: 2024-09-13
Attending: EMERGENCY MEDICINE
Payer: COMMERCIAL

## 2024-09-13 VITALS
WEIGHT: 110 LBS | OXYGEN SATURATION: 99 % | RESPIRATION RATE: 16 BRPM | BODY MASS INDEX: 17.68 KG/M2 | TEMPERATURE: 98.4 F | HEART RATE: 82 BPM | SYSTOLIC BLOOD PRESSURE: 113 MMHG | DIASTOLIC BLOOD PRESSURE: 73 MMHG | HEIGHT: 66 IN

## 2024-09-13 DIAGNOSIS — S46.912A LEFT SHOULDER STRAIN, INITIAL ENCOUNTER: Primary | ICD-10-CM

## 2024-09-13 LAB
ALBUMIN SERPL-MCNC: 5.1 G/DL (ref 3.5–4.6)
ALP SERPL-CCNC: 58 U/L (ref 40–130)
ALT SERPL-CCNC: 13 U/L (ref 0–33)
AMYLASE SERPL-CCNC: 49 U/L (ref 22–93)
ANION GAP SERPL CALCULATED.3IONS-SCNC: 9 MEQ/L (ref 9–15)
AST SERPL-CCNC: 19 U/L (ref 0–35)
BASOPHILS # BLD: 0.1 K/UL (ref 0–0.2)
BASOPHILS NFR BLD: 1.3 %
BILIRUB SERPL-MCNC: 0.5 MG/DL (ref 0.2–0.7)
BUN SERPL-MCNC: 9 MG/DL (ref 6–20)
CALCIUM SERPL-MCNC: 9.3 MG/DL (ref 8.5–9.9)
CHLORIDE SERPL-SCNC: 102 MEQ/L (ref 95–107)
CO2 SERPL-SCNC: 27 MEQ/L (ref 20–31)
CREAT SERPL-MCNC: 0.65 MG/DL (ref 0.5–0.9)
EOSINOPHIL # BLD: 0 K/UL (ref 0–0.7)
EOSINOPHIL NFR BLD: 0 %
ERYTHROCYTE [DISTWIDTH] IN BLOOD BY AUTOMATED COUNT: 12 % (ref 11.5–14.5)
GLOBULIN SER CALC-MCNC: 2 G/DL (ref 2.3–3.5)
GLUCOSE SERPL-MCNC: 96 MG/DL (ref 70–99)
HCT VFR BLD AUTO: 39.5 % (ref 37–47)
HGB BLD-MCNC: 13.6 G/DL (ref 12–16)
LIPASE SERPL-CCNC: 21 U/L (ref 12–95)
LYMPHOCYTES # BLD: 1.7 K/UL (ref 1–4.8)
LYMPHOCYTES NFR BLD: 42.4 %
MCH RBC QN AUTO: 31.9 PG (ref 27–31.3)
MCHC RBC AUTO-ENTMCNC: 34.4 % (ref 33–37)
MCV RBC AUTO: 92.7 FL (ref 79.4–94.8)
MONOCYTES # BLD: 0.3 K/UL (ref 0.2–0.8)
MONOCYTES NFR BLD: 6.6 %
NEUTROPHILS # BLD: 2 K/UL (ref 1.4–6.5)
NEUTS SEG NFR BLD: 49.7 %
PLATELET # BLD AUTO: 293 K/UL (ref 130–400)
POTASSIUM SERPL-SCNC: 3.9 MEQ/L (ref 3.4–4.9)
PROT SERPL-MCNC: 7.1 G/DL (ref 6.3–8)
RBC # BLD AUTO: 4.26 M/UL (ref 4.2–5.4)
SODIUM SERPL-SCNC: 138 MEQ/L (ref 135–144)
WBC # BLD AUTO: 3.9 K/UL (ref 4.5–11)

## 2024-09-13 PROCEDURE — 73030 X-RAY EXAM OF SHOULDER: CPT

## 2024-09-13 PROCEDURE — 85025 COMPLETE CBC W/AUTO DIFF WBC: CPT

## 2024-09-13 PROCEDURE — 6370000000 HC RX 637 (ALT 250 FOR IP): Performed by: EMERGENCY MEDICINE

## 2024-09-13 PROCEDURE — 83516 IMMUNOASSAY NONANTIBODY: CPT

## 2024-09-13 PROCEDURE — 36415 COLL VENOUS BLD VENIPUNCTURE: CPT

## 2024-09-13 PROCEDURE — 80053 COMPREHEN METABOLIC PANEL: CPT

## 2024-09-13 PROCEDURE — 83690 ASSAY OF LIPASE: CPT

## 2024-09-13 PROCEDURE — 99283 EMERGENCY DEPT VISIT LOW MDM: CPT

## 2024-09-13 PROCEDURE — 82150 ASSAY OF AMYLASE: CPT

## 2024-09-13 RX ORDER — CYCLOBENZAPRINE HCL 5 MG
5 TABLET ORAL 2 TIMES DAILY PRN
Qty: 30 TABLET | Refills: 0 | Status: SHIPPED | OUTPATIENT
Start: 2024-09-13 | End: 2024-09-23

## 2024-09-13 RX ORDER — KETOROLAC TROMETHAMINE 10 MG/1
10 TABLET, FILM COATED ORAL EVERY 6 HOURS PRN
Qty: 20 TABLET | Refills: 0 | Status: SHIPPED | OUTPATIENT
Start: 2024-09-13

## 2024-09-13 RX ORDER — ACETAMINOPHEN 500 MG
1000 TABLET ORAL
Status: COMPLETED | OUTPATIENT
Start: 2024-09-13 | End: 2024-09-13

## 2024-09-13 RX ADMIN — ACETAMINOPHEN 1000 MG: 500 TABLET ORAL at 20:26

## 2024-09-13 ASSESSMENT — PAIN DESCRIPTION - ORIENTATION: ORIENTATION: LEFT

## 2024-09-13 ASSESSMENT — ENCOUNTER SYMPTOMS
COLOR CHANGE: 0
NAUSEA: 0
ABDOMINAL DISTENTION: 0
RHINORRHEA: 0
BACK PAIN: 0
PHOTOPHOBIA: 0
DIARRHEA: 0
WHEEZING: 0
SORE THROAT: 0
ABDOMINAL PAIN: 0
COUGH: 0
CONSTIPATION: 0
SINUS PRESSURE: 0
APNEA: 0
EYE PAIN: 0
VOMITING: 0
SHORTNESS OF BREATH: 0

## 2024-09-13 ASSESSMENT — PAIN DESCRIPTION - ONSET: ONSET: SUDDEN

## 2024-09-13 ASSESSMENT — PAIN SCALES - GENERAL: PAINLEVEL_OUTOF10: 6

## 2024-09-13 ASSESSMENT — LIFESTYLE VARIABLES
HOW MANY STANDARD DRINKS CONTAINING ALCOHOL DO YOU HAVE ON A TYPICAL DAY: 1 OR 2
HOW OFTEN DO YOU HAVE A DRINK CONTAINING ALCOHOL: MONTHLY OR LESS

## 2024-09-13 ASSESSMENT — PAIN DESCRIPTION - LOCATION: LOCATION: SHOULDER

## 2024-09-13 ASSESSMENT — PAIN DESCRIPTION - PAIN TYPE: TYPE: ACUTE PAIN

## 2024-09-13 ASSESSMENT — PAIN DESCRIPTION - FREQUENCY: FREQUENCY: CONTINUOUS

## 2024-09-13 ASSESSMENT — PAIN - FUNCTIONAL ASSESSMENT: PAIN_FUNCTIONAL_ASSESSMENT: 0-10

## 2024-09-13 ASSESSMENT — PAIN DESCRIPTION - DESCRIPTORS: DESCRIPTORS: SHARP;ACHING

## 2024-09-15 LAB — TTG IGA SER IA-ACNC: 1.54 FLU (ref 0–4.99)

## 2024-10-30 ENCOUNTER — OFFICE VISIT (OUTPATIENT)
Dept: GASTROENTEROLOGY | Age: 19
End: 2024-10-30
Payer: COMMERCIAL

## 2024-10-30 VITALS
WEIGHT: 111 LBS | OXYGEN SATURATION: 99 % | BODY MASS INDEX: 17.84 KG/M2 | HEART RATE: 83 BPM | DIASTOLIC BLOOD PRESSURE: 64 MMHG | HEIGHT: 66 IN | SYSTOLIC BLOOD PRESSURE: 103 MMHG

## 2024-10-30 DIAGNOSIS — R10.33 PERIUMBILICAL ABDOMINAL PAIN: Primary | ICD-10-CM

## 2024-10-30 PROCEDURE — 99203 OFFICE O/P NEW LOW 30 MIN: CPT | Performed by: SPECIALIST

## 2024-10-30 RX ORDER — LORATADINE 10 MG/1
10 CAPSULE, LIQUID FILLED ORAL DAILY
COMMUNITY

## 2024-10-30 RX ORDER — LEVONORGESTREL 52 MG/1
1 INTRAUTERINE DEVICE INTRAUTERINE ONCE
COMMUNITY

## 2024-10-30 RX ORDER — TRAZODONE HYDROCHLORIDE 50 MG/1
50 TABLET, FILM COATED ORAL
COMMUNITY
Start: 2024-07-24

## 2024-10-30 RX ORDER — METHYLPHENIDATE HYDROCHLORIDE 27 MG/1
TABLET ORAL
COMMUNITY
Start: 2024-10-18

## 2024-10-30 RX ORDER — HYDROXYZINE PAMOATE 25 MG/1
CAPSULE ORAL
COMMUNITY
Start: 2024-04-29

## 2024-10-30 ASSESSMENT — ENCOUNTER SYMPTOMS
CONSTIPATION: 0
ABDOMINAL PAIN: 1
BLOOD IN STOOL: 0
RESPIRATORY NEGATIVE: 1
VOMITING: 1
RECTAL PAIN: 0
DIARRHEA: 0
EYES NEGATIVE: 1
ABDOMINAL DISTENTION: 0
NAUSEA: 0
ANAL BLEEDING: 0

## 2024-10-30 NOTE — PROGRESS NOTES
Gastroenterology Clinic Visit    Opal Grady  [id number]  Chief Complaint   Patient presents with    New Patient    Nausea       HPI: 18 y.o. female presents to the clinic with history of nausea and abdominal discomfort usually after a meal for the last many years and feels that symptoms has been gradually getting worse.  Had an episode of emesis about 6 to 8 weeks ago.  No history of hematemesis or melena.  Location of the abdominal pain is generalized and not very consistent.  Has occasional intolerance to greasy food and dairy products.  History of 7 to 10 pound weight loss in the last 6 months.  Appetite varies.  Takes NSAIDs occasionally, no prior history of ulcer disease, also has altered bowel habits occasionally with diarrhea and constipation.  No history of rectal bleed.  Social history smokes cigarettes occasionally and also has been using cannabis intermittently since last many years.  Has 2 or 3 drinks of alcohol a week.  Patient had CBC amylase lipase and CMP on March 13, 2024 and they were all normal, family history unremarkable    Review of Systems   Constitutional: Negative.    HENT: Negative.     Eyes: Negative.    Respiratory: Negative.     Cardiovascular: Negative.    Gastrointestinal:  Positive for abdominal pain and vomiting. Negative for abdominal distention, anal bleeding, blood in stool, constipation, diarrhea, nausea and rectal pain.   Endocrine: Negative.    Genitourinary: Negative.    Musculoskeletal: Negative.    Skin: Negative.    Allergic/Immunologic: Positive for food allergies.   Neurological: Negative.    Hematological: Negative.    Psychiatric/Behavioral: Negative.          History of depression        Past Medical History:   Diagnosis Date    Depression       No past surgical history on file.  Current Outpatient Medications on File Prior to Visit   Medication Sig Dispense Refill    hydrOXYzine pamoate (VISTARIL) 25 MG capsule take 1 capsule by mouth everyday at bedtime

## 2025-02-16 RX ORDER — LAMOTRIGINE 200 MG/1
TABLET ORAL 2 TIMES DAILY
Qty: 60 TABLET | OUTPATIENT
Start: 2025-02-16

## 2025-05-23 RX ORDER — TRAZODONE HYDROCHLORIDE 100 MG/1
100 TABLET ORAL NIGHTLY
Qty: 90 TABLET | OUTPATIENT
Start: 2025-05-23